# Patient Record
Sex: FEMALE | Race: WHITE | NOT HISPANIC OR LATINO | Employment: UNEMPLOYED | URBAN - METROPOLITAN AREA
[De-identification: names, ages, dates, MRNs, and addresses within clinical notes are randomized per-mention and may not be internally consistent; named-entity substitution may affect disease eponyms.]

---

## 2021-10-07 ENCOUNTER — OFFICE VISIT (OUTPATIENT)
Dept: OBSTETRICS AND GYNECOLOGY | Facility: CLINIC | Age: 41
End: 2021-10-07

## 2021-10-07 VITALS
DIASTOLIC BLOOD PRESSURE: 79 MMHG | HEIGHT: 62 IN | BODY MASS INDEX: 27.05 KG/M2 | SYSTOLIC BLOOD PRESSURE: 126 MMHG | WEIGHT: 147 LBS

## 2021-10-07 DIAGNOSIS — Z11.3 SCREENING EXAMINATION FOR STD (SEXUALLY TRANSMITTED DISEASE): ICD-10-CM

## 2021-10-07 DIAGNOSIS — N89.8 VAGINAL ODOR: ICD-10-CM

## 2021-10-07 DIAGNOSIS — R10.2 PELVIC PAIN: ICD-10-CM

## 2021-10-07 DIAGNOSIS — Z01.419 WELL WOMAN EXAM: ICD-10-CM

## 2021-10-07 DIAGNOSIS — N73.0 ACUTE PID (PELVIC INFLAMMATORY DISEASE): ICD-10-CM

## 2021-10-07 DIAGNOSIS — Z12.4 CERVICAL CANCER SCREENING: Primary | ICD-10-CM

## 2021-10-07 PROCEDURE — 3008F BODY MASS INDEX DOCD: CPT | Performed by: STUDENT IN AN ORGANIZED HEALTH CARE EDUCATION/TRAINING PROGRAM

## 2021-10-07 PROCEDURE — 2014F MENTAL STATUS ASSESS: CPT | Performed by: STUDENT IN AN ORGANIZED HEALTH CARE EDUCATION/TRAINING PROGRAM

## 2021-10-07 PROCEDURE — 99385 PREV VISIT NEW AGE 18-39: CPT | Performed by: STUDENT IN AN ORGANIZED HEALTH CARE EDUCATION/TRAINING PROGRAM

## 2021-10-07 PROCEDURE — 96372 THER/PROPH/DIAG INJ SC/IM: CPT | Performed by: STUDENT IN AN ORGANIZED HEALTH CARE EDUCATION/TRAINING PROGRAM

## 2021-10-07 RX ORDER — METRONIDAZOLE 500 MG/1
500 TABLET ORAL 2 TIMES DAILY
Qty: 28 TABLET | Refills: 0 | Status: SHIPPED | OUTPATIENT
Start: 2021-10-07 | End: 2021-10-21

## 2021-10-07 RX ORDER — CEFTRIAXONE 1 G/1
500 INJECTION, POWDER, FOR SOLUTION INTRAMUSCULAR; INTRAVENOUS ONCE
Status: COMPLETED | OUTPATIENT
Start: 2021-10-07 | End: 2021-10-07

## 2021-10-07 RX ORDER — DOXYCYCLINE HYCLATE 100 MG/1
100 CAPSULE ORAL 2 TIMES DAILY
Qty: 28 CAPSULE | Refills: 0 | Status: SHIPPED | OUTPATIENT
Start: 2021-10-07 | End: 2021-10-21

## 2021-10-07 RX ADMIN — CEFTRIAXONE 500 MG: 1 INJECTION, POWDER, FOR SOLUTION INTRAMUSCULAR; INTRAVENOUS at 16:05

## 2021-10-07 NOTE — PROGRESS NOTES
GYN Annual Exam     CC- Here for annual exam.     Nash Davila is a 41 y.o. female who presents for annual well woman exam. She also has multiple concerns today. LMP 21.   She reports that she has been having intermittent foul smelling discharge for the last 1-2 years. She has been eating yogurt and will douche with hydrogen peroxide but despite these treatments, she still has recurrences of discharge.   She also is concerned regarding abdominal pain, bloating, and vaginal spotting that occurred last week. She states that last week she laid in bed for 2 days after having heavy mid-cycle vaginal bleeding, blood in her urine, fatigue, nausea. She states that her RN friend thinks she might have had a ruptured ovarian cyst.     She has taken seasonique previously  for menstrual induced migraines.   Periods are currently in the middle of the month, last 7-8 days. Dysmenorrhea- severe- first couple days, worse as she has gotten older.   Menarche 18.5 years old   History of kidney infections. 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     OB History        3    Para   2    Term                AB   1    Living           SAB        TAB        Ectopic        Molar        Multiple        Live Births                  Currently sexually active with one male partner.   Current contraception: tubal ligation  History of abnormal Pap smear: yes- , performed biopsy unsure if she had a cone or LEEP , HPV positive   Family history of uterine, colon or ovarian cancer: unknown, adopted.  History of abnormal mammogram: n/a  Family history of breast cancer: unknown, adopted  Last Pap : - unsure of result     Past Medical History:   Diagnosis Date   • Abnormal Pap smear of cervix        Past Surgical History:   Procedure Laterality Date   • TUBAL ABDOMINAL LIGATION         No current outpatient medications on file.    No Known Allergies    Social History     Tobacco Use   • Smoking status: Current Every Day  "Smoker   Substance Use Topics   • Alcohol use: Yes     Comment: rare   • Drug use: Yes     Types: Marijuana       History reviewed. No pertinent family history.    Review of Systems   Constitutional: Positive for fatigue.   Gastrointestinal: Positive for abdominal distention and abdominal pain.   Genitourinary: Positive for vaginal bleeding and vaginal discharge.       /79   Ht 157.5 cm (62\")   Wt 66.7 kg (147 lb)   LMP  (LMP Unknown)   BMI 26.89 kg/m²     Physical Exam  Vitals reviewed. Exam conducted with a chaperone present.   Constitutional:       General: She is not in acute distress.  HENT:      Head: Normocephalic and atraumatic.      Right Ear: External ear normal.      Left Ear: External ear normal.   Eyes:      Extraocular Movements: Extraocular movements intact.      Pupils: Pupils are equal, round, and reactive to light.   Cardiovascular:      Rate and Rhythm: Normal rate and regular rhythm.   Pulmonary:      Effort: Pulmonary effort is normal. No respiratory distress.   Chest:   Breasts: Breasts are symmetrical.      Right: Normal. No swelling, bleeding, inverted nipple, mass, nipple discharge, skin change, tenderness or axillary adenopathy.      Left: Normal. No swelling, bleeding, inverted nipple, mass, nipple discharge, skin change, tenderness or axillary adenopathy.       Abdominal:      General: There is no distension.      Palpations: Abdomen is soft. There is no mass.      Tenderness: There is abdominal tenderness in the right lower quadrant, suprapubic area and left lower quadrant. There is no guarding or rebound.      Hernia: No hernia is present.   Genitourinary:     General: Normal vulva.      Exam position: Lithotomy position.      Labia:         Right: No rash, tenderness, lesion or injury.         Left: No rash, tenderness, lesion or injury.       Urethra: No prolapse or urethral swelling.      Vagina: Vaginal discharge present. No erythema, tenderness, bleeding or lesions.      " Cervix: Cervical motion tenderness present.      Uterus: Tender. Not enlarged and not fixed.       Adnexa:         Right: Tenderness present. No mass or fullness.          Left: Tenderness present. No mass or fullness.        Comments: White thin vaginal discharge present in vault.   Musculoskeletal:         General: No deformity. Normal range of motion.      Cervical back: Normal range of motion and neck supple.   Lymphadenopathy:      Upper Body:      Right upper body: No axillary adenopathy.      Left upper body: No axillary adenopathy.      Lower Body: No right inguinal adenopathy. No left inguinal adenopathy.   Skin:     General: Skin is warm and dry.   Neurological:      General: No focal deficit present.      Mental Status: She is alert and oriented to person, place, and time.   Psychiatric:         Mood and Affect: Mood normal.         Behavior: Behavior normal.       Assessment     1) GYN annual well woman exam.   2) Cervical cancer screening  3) Vaginal odor  4) Pelvic pain  5) Acute PID  6) STD screening      Plan     1) Breast Health - Clinical breast exam yearly, Self breast awareness monthly. Recommend mammogram screening for breast cancer starting at age 40.   2) Pap - pap smear with cotesting collected today and will notify patient of results.   3) Smoking status- current every day smoker. Encouraged smoking cessation.   4) Activity recommends - Adult 150-300 min/week of multi-component physical activities that include balance training, aerobic and physical strengthening.    5) Vaginal odor/pelvic pain/ acute PID/STD screening- Performed clinical pelvic exam today with chandelier sign present. Collected NuSwab+ and genital Mycoplasma swab to rule out STD as cause of presumed PID on exam. I discussed that PID is most commonly caused by STDs but can be from an ascending bacterial infection not associated with STDs. Will treat the patient with dose of rocephin 500 mg IM x one dose and send prescription  for doxycycline 100 mg BID x 14 days and flagyl 500 mg BID x 14 days. Will also obtain a pelvic ultrasound today to evaluate pelvic pain. Her pain could also be secondary to a ruptured ovarian cyst that resolved in free fluid in the pelvis that could lead to peritoneal irration and the symptoms she is experiencing. Lastly, will obtain STD screening for HIV, RPR, Hep B, and Hep C by blood testing today. Patient advised to follow up in 2 weeks. All questions and concerns answered.       Dayana Carr MD

## 2021-10-08 LAB
HBV SURFACE AG SERPL QL IA: NEGATIVE
HCV AB S/CO SERPL IA: <0.1 S/CO RATIO (ref 0–0.9)
HIV 1+2 AB+HIV1 P24 AG SERPL QL IA: NON REACTIVE
RPR SER QL: NON REACTIVE

## 2021-10-11 LAB
CYTOLOGIST CVX/VAG CYTO: NORMAL
CYTOLOGY CVX/VAG DOC CYTO: NORMAL
CYTOLOGY CVX/VAG DOC THIN PREP: NORMAL
DX ICD CODE: NORMAL
HIV 1 & 2 AB SER-IMP: NORMAL
HPV I/H RISK 4 DNA CVX QL PROBE+SIG AMP: NEGATIVE
OTHER STN SPEC: NORMAL
STAT OF ADQ CVX/VAG CYTO-IMP: NORMAL

## 2021-10-13 ENCOUNTER — TELEPHONE (OUTPATIENT)
Dept: OBSTETRICS AND GYNECOLOGY | Facility: CLINIC | Age: 41
End: 2021-10-13

## 2021-10-13 LAB
M GENITALIUM DNA SPEC QL NAA+PROBE: NEGATIVE
M HOMINIS DNA SPEC QL NAA+PROBE: POSITIVE
UREAPLASMA DNA SPEC QL NAA+PROBE: POSITIVE

## 2021-10-13 NOTE — TELEPHONE ENCOUNTER
"Hi Dr. Carr,    Pt said that since taking doxycycline (VIBRAMYCIN) 100 MG capsule      and metroNIDAZOLE (Flagyl) 500 MG tablet [9096] (Order [id number])     as directed, she is having a great deal of pain in her lower right abdomen. Not to ER level, but stated that she can't function normally. Advised to go to ER if it worsens.      Also she inquired about the remaining results for STDs: namely gonorrhea and chlamydia.     She is aware of this: \"Please call and let her know that her blood work showed she is negative for HIV, syphilis, hepatitis B, and hepatitis C. We are still awaiting her other results. Thanks!\"    Please advise.    Thank you,  Esperanza"

## 2021-10-13 NOTE — TELEPHONE ENCOUNTER
Attempted to contact the patient but call went straight to voicemail. Left voicemail stating I would try to call tomorrow.     Dayana Carr MD

## 2021-10-20 LAB
A VAGINAE DNA VAG QL NAA+PROBE: NORMAL SCORE
BVAB2 DNA VAG QL NAA+PROBE: NORMAL SCORE
C ALBICANS DNA VAG QL NAA+PROBE: NEGATIVE
C GLABRATA DNA VAG QL NAA+PROBE: NEGATIVE
C TRACH DNA VAG QL NAA+PROBE: NEGATIVE
MEGA1 DNA VAG QL NAA+PROBE: NORMAL SCORE
N GONORRHOEA DNA VAG QL NAA+PROBE: NEGATIVE
T VAGINALIS DNA VAG QL NAA+PROBE: NEGATIVE

## 2021-11-11 ENCOUNTER — OFFICE VISIT (OUTPATIENT)
Dept: OBSTETRICS AND GYNECOLOGY | Facility: CLINIC | Age: 41
End: 2021-11-11

## 2021-11-11 VITALS
HEIGHT: 62 IN | DIASTOLIC BLOOD PRESSURE: 81 MMHG | BODY MASS INDEX: 29.44 KG/M2 | SYSTOLIC BLOOD PRESSURE: 121 MMHG | WEIGHT: 160 LBS

## 2021-11-11 DIAGNOSIS — R10.2 PELVIC PAIN: Primary | ICD-10-CM

## 2021-11-11 DIAGNOSIS — N89.8 VAGINAL DISCHARGE: ICD-10-CM

## 2021-11-11 DIAGNOSIS — R32 URINARY INCONTINENCE, UNSPECIFIED TYPE: ICD-10-CM

## 2021-11-11 PROCEDURE — 99214 OFFICE O/P EST MOD 30 MIN: CPT | Performed by: STUDENT IN AN ORGANIZED HEALTH CARE EDUCATION/TRAINING PROGRAM

## 2021-11-11 RX ORDER — LEVONORGESTREL / ETHINYL ESTRADIOL AND ETHINYL ESTRADIOL 150-30(84)
1 KIT ORAL DAILY
Qty: 91 EACH | Refills: 3 | Status: SHIPPED | OUTPATIENT
Start: 2021-11-11 | End: 2022-02-10

## 2021-11-11 RX ORDER — DOXYCYCLINE HYCLATE 100 MG/1
100 CAPSULE ORAL 2 TIMES DAILY
Qty: 28 CAPSULE | Refills: 0 | Status: SHIPPED | OUTPATIENT
Start: 2021-11-11 | End: 2021-11-22

## 2021-11-11 RX ORDER — METRONIDAZOLE 500 MG/1
500 TABLET ORAL 2 TIMES DAILY
Qty: 28 TABLET | Refills: 0 | Status: SHIPPED | OUTPATIENT
Start: 2021-11-11 | End: 2021-11-22

## 2021-11-15 DIAGNOSIS — N39.0 E-COLI UTI: Primary | ICD-10-CM

## 2021-11-15 DIAGNOSIS — B96.20 E-COLI UTI: Primary | ICD-10-CM

## 2021-11-15 LAB
BACTERIA UR CULT: ABNORMAL
BACTERIA UR CULT: ABNORMAL
OTHER ANTIBIOTIC SUSC ISLT: ABNORMAL

## 2021-11-15 RX ORDER — NITROFURANTOIN 25; 75 MG/1; MG/1
100 CAPSULE ORAL 2 TIMES DAILY
Qty: 14 CAPSULE | Refills: 0 | Status: SHIPPED | OUTPATIENT
Start: 2021-11-15 | End: 2021-11-22

## 2021-11-22 DIAGNOSIS — N76.0 BV (BACTERIAL VAGINOSIS): Primary | ICD-10-CM

## 2021-11-22 DIAGNOSIS — B96.89 BV (BACTERIAL VAGINOSIS): Primary | ICD-10-CM

## 2021-11-22 LAB
A VAGINAE DNA VAG QL NAA+PROBE: ABNORMAL SCORE
BVAB2 DNA VAG QL NAA+PROBE: ABNORMAL SCORE
C ALBICANS DNA VAG QL NAA+PROBE: NEGATIVE
C GLABRATA DNA VAG QL NAA+PROBE: NEGATIVE
C TRACH DNA VAG QL NAA+PROBE: NEGATIVE
M GENITALIUM DNA SPEC QL NAA+PROBE: NEGATIVE
M HOMINIS DNA SPEC QL NAA+PROBE: POSITIVE
MEGA1 DNA VAG QL NAA+PROBE: ABNORMAL SCORE
N GONORRHOEA DNA VAG QL NAA+PROBE: NEGATIVE
T VAGINALIS DNA VAG QL NAA+PROBE: NEGATIVE
UREAPLASMA DNA SPEC QL NAA+PROBE: POSITIVE

## 2021-11-22 RX ORDER — AZITHROMYCIN 250 MG/1
TABLET, FILM COATED ORAL
Qty: 6 TABLET | Refills: 0 | Status: SHIPPED | OUTPATIENT
Start: 2021-11-22 | End: 2021-11-27

## 2021-11-26 NOTE — PROGRESS NOTES
Chief Complaint   Patient presents with   • Follow-up     left side pain        SUBJECTIVE:     Nash Davila is a 41 y.o.  who presents with left sided abdominal and pelvic pain. She was last seen on 10/7/21 for an annual exam and had concerns for a ruptured ovarian cyst because of abdominal pain, bloating and vaginal spotting. She had an ultrasound that day that noted a left hydrosalpinx and her exam was concerning for pelvic inflammatory disease. She was treated with doxycycline, flagyl, and rocephin. She reports that she struggled to take the doxycycline and flagyl together because of GI side effects. She was tested positive for ureaplasma spp and Mycoplasma hominis at the time. She reported improvement in her symptoms after completing antibiotics for about a week but has noted return of left sided fullness, fatigue, and occasional sharp pain in her left side over the last couple of days. She also has noted some cloudiness in her urine and has had intermittent leakage of urine. She also now has bloody vaginal discharge again. She is worried again for ovarian cyst rupture. Denies dysuria, fever, chills.     Past Medical History:   Diagnosis Date   • Abnormal Pap smear of cervix       Past Surgical History:   Procedure Laterality Date   • TUBAL ABDOMINAL LIGATION        Social History     Tobacco Use   • Smoking status: Current Every Day Smoker   • Smokeless tobacco: Not on file   Substance Use Topics   • Alcohol use: Yes     Comment: rare   • Drug use: Yes     Types: Marijuana     OB History    Para Term  AB Living   3 2     1     SAB IAB Ectopic Molar Multiple Live Births                    # Outcome Date GA Lbr Taco/2nd Weight Sex Delivery Anes PTL Lv   3 AB            2 Para            1 Para                 Review of Systems   Constitutional: Negative for chills and fever.   Gastrointestinal: Positive for abdominal distention and abdominal pain.   Genitourinary: Positive for pelvic  "pain, vaginal bleeding and vaginal discharge. Negative for difficulty urinating, dysuria and frequency.       OBJECTIVE:   Vitals:    11/11/21 1255   BP: 121/81   Weight: 72.6 kg (160 lb)   Height: 157.5 cm (62.01\")        Physical Exam  Vitals reviewed. Exam conducted with a chaperone present.   Constitutional:       General: She is not in acute distress.     Appearance: Normal appearance.   HENT:      Head: Normocephalic and atraumatic.      Right Ear: External ear normal.      Left Ear: External ear normal.   Eyes:      Extraocular Movements: Extraocular movements intact.      Pupils: Pupils are equal, round, and reactive to light.   Pulmonary:      Effort: Pulmonary effort is normal. No respiratory distress.   Abdominal:      General: There is no distension.      Palpations: Abdomen is soft.      Tenderness: There is abdominal tenderness in the suprapubic area and left lower quadrant. There is no guarding or rebound.   Genitourinary:     General: Normal vulva.      Exam position: Lithotomy position.      Labia:         Right: No rash, tenderness, lesion or injury.         Left: No rash, tenderness, lesion or injury.       Urethra: No prolapse or urethral swelling.      Vagina: Vaginal discharge present. No erythema, tenderness, bleeding or lesions.      Cervix: Normal.      Uterus: Normal. Not enlarged, not fixed and not tender.       Adnexa: Right adnexa normal.        Right: No mass, tenderness or fullness.          Left: Tenderness present. No mass or fullness.        Comments: Thin white vaginal discharge in vault. Mild tenderness of left adnexa.   Musculoskeletal:         General: No deformity. Normal range of motion.      Cervical back: Normal range of motion and neck supple.   Lymphadenopathy:      Lower Body: No right inguinal adenopathy. No left inguinal adenopathy.   Skin:     General: Skin is warm and dry.   Neurological:      General: No focal deficit present.      Mental Status: She is alert and " oriented to person, place, and time.   Psychiatric:         Mood and Affect: Mood normal.         Behavior: Behavior normal.         ASSESSMENT:     ICD-10-CM ICD-9-CM   1. Pelvic pain  R10.2 [id number]   2. Vaginal discharge  N89.8 623.5   3. Urinary incontinence, unspecified type  R32 788.30       PLAN:   Pelvic pain  - Pelvic ultrasound ordered and performed today which noted a left ovarian cyst and persistent left hydrosalpinx which is likely leading to her left pelvic fullness as her exam has improved significantly from last evaluation in October. Also included in differential diagnosis for pelvic pain is UTI, PID, STD. Collected NuSwab + and genital mycoplasmas to rule out STD or bacteria that could lead to PID. Urine culture collected to rule out UTI as cause of symptoms. Will have prescribe patient flagyl 500 mg BID x 14 days and doxycycline 100 mg BID x 14 days for treatment of PID again. Additionally, will have patient start Seasonique to help suppress ovulation and hopefully prevent further ovarian cysts. I discussed that she has increased risk of VTE disease, hypertension given that she is >35 years old and smokes <15 cigarettes daily. Hopefully, she will only need to use for a short time.   Vaginal discharge  - Ordered NuSwab+ and genital mycoplasma to rule out STD, vaginitis as cause of discharge.   Urinary incontinence  - Ordered urine culture to rule out UTI.   Will notify patient of results and make adjustments to treatment as needed. All questions and concerns answered. Will based follow up on symptomatic improvement or failure.     See below for orders    Orders Placed This Encounter   Procedures   • Urine Culture - Urine, Urine, Clean Catch     Order Specific Question:   Release to patient     Answer:   Immediate     Order Specific Question:   [facility] Has the patient fasted?     Answer:   No   • NuSwab VG+ - Swab, Vagina     Order Specific Question:   Release to patient     Answer:   Immediate      Order Specific Question:   [facility] Has the patient fasted?     Answer:   No   • Genital Mycoplasmas FLORIDALMA, Swab - Swab, Vagina     Order Specific Question:   Release to patient     Answer:   Immediate     Order Specific Question:   [facility] Has the patient fasted?     Answer:   No   • US Non-ob Transvaginal     Order Specific Question:   Reason for Exam:     Answer:   left pelvic pain      Dayana Carr MD

## 2022-02-10 ENCOUNTER — OFFICE VISIT (OUTPATIENT)
Dept: OBSTETRICS AND GYNECOLOGY | Facility: CLINIC | Age: 42
End: 2022-02-10

## 2022-02-10 VITALS
BODY MASS INDEX: 31.28 KG/M2 | HEIGHT: 62 IN | SYSTOLIC BLOOD PRESSURE: 114 MMHG | DIASTOLIC BLOOD PRESSURE: 76 MMHG | WEIGHT: 170 LBS

## 2022-02-10 DIAGNOSIS — R68.82 DECREASED LIBIDO: ICD-10-CM

## 2022-02-10 DIAGNOSIS — R45.86 MOOD SWINGS: ICD-10-CM

## 2022-02-10 DIAGNOSIS — N39.3 STRESS INCONTINENCE OF URINE: ICD-10-CM

## 2022-02-10 DIAGNOSIS — R22.31 MASS OF RIGHT AXILLA: Primary | ICD-10-CM

## 2022-02-10 PROCEDURE — 99213 OFFICE O/P EST LOW 20 MIN: CPT | Performed by: STUDENT IN AN ORGANIZED HEALTH CARE EDUCATION/TRAINING PROGRAM

## 2022-02-10 NOTE — PROGRESS NOTES
Chief Complaint   Patient presents with   • Follow-up     stopped b/c and has had issues with bleeding and moods,Urinary leakage        SUBJECTIVE:     Nash Davila is a 41 y.o.  who presents for follow up of pelvic pain and urinary incontinence. The patient was started on Seasonique at her last appointment to help suppress ovulation and prevent formation of ovarian cyst but 3 weeks into starting the pills she began to having vaginal spotting that turned from pink and red to dark brown and purple. This went on for 3 weeks and then the patient discontinued use due to the breakthrough bleeding. Her periods have been normal every month, lasting 7 days. She has been experiencing getting hot at night, fatigue, low libido, mood swings, and trouble with acne (which has been present for a long time.) She is also concerned regarding a nodule in her right armpit that has been off and on for 2 years and she thinks its a lymph node. It is non-tender. She also complains of urinary leakage when she coughs or sneezes that occurs randomly. She reports she does have some pain with urination after she wakes up. Denies urgency or frequency. Denies further vaginal discharge.       Past Medical History:   Diagnosis Date   • Abnormal Pap smear of cervix       Past Surgical History:   Procedure Laterality Date   • TUBAL ABDOMINAL LIGATION        Social History     Tobacco Use   • Smoking status: Current Every Day Smoker   • Smokeless tobacco: Not on file   Substance Use Topics   • Alcohol use: Yes     Comment: rare   • Drug use: Yes     Types: Marijuana     OB History    Para Term  AB Living   3 2     1     SAB IAB Ectopic Molar Multiple Live Births                    # Outcome Date GA Lbr Taco/2nd Weight Sex Delivery Anes PTL Lv   3 AB            2 Para            1 Para                 Review of Systems   Constitutional: Positive for fatigue.   Endocrine: Positive for heat intolerance.   Genitourinary: Negative  "for dysuria, frequency, urgency and vaginal discharge.   Psychiatric/Behavioral: The patient is nervous/anxious.        OBJECTIVE:   Vitals:    02/10/22 1010   BP: 114/76   Weight: 77.1 kg (170 lb)   Height: 157.5 cm (62.01\")        Physical Exam  Vitals reviewed.   Constitutional:       General: She is not in acute distress.  HENT:      Head: Normocephalic and atraumatic.      Right Ear: External ear normal.      Left Ear: External ear normal.   Eyes:      Extraocular Movements: Extraocular movements intact.      Pupils: Pupils are equal, round, and reactive to light.   Pulmonary:      Effort: Pulmonary effort is normal. No respiratory distress.   Chest:   Breasts:      Right: Axillary adenopathy (Small 0.5 nodule in right axilla) present.       Abdominal:      General: There is no distension.      Palpations: Abdomen is soft.      Tenderness: There is no abdominal tenderness. There is no guarding or rebound.   Musculoskeletal:         General: No deformity. Normal range of motion.      Cervical back: Normal range of motion and neck supple.   Lymphadenopathy:      Upper Body:      Right upper body: Axillary adenopathy (Small 0.5 nodule in right axilla) present.   Skin:     General: Skin is warm and dry.   Neurological:      General: No focal deficit present.      Mental Status: She is alert and oriented to person, place, and time.   Psychiatric:         Mood and Affect: Mood normal.         Behavior: Behavior normal.         ASSESSMENT:     ICD-10-CM ICD-9-CM   1. Mass of right axilla  R22.31 782.2   2. Mood swings  R45.86 799.24   3. Decreased libido  R68.82 799.81   4. Stress incontinence of urine  N39.3 [id number]       PLAN:   - Will obtain ultrasound of the right axilla to evaluate suspected lymph node.   - Will obtain estradiol and FSH level to see if the patient is starting to experience premature ovarian failure given perimenopausal symptoms.   - Patient had recent history of E.coli UTI in 11/2021 which is " when her urinary leakage began so have ordered urine culture to rule out UTI as cause of urinary incontinence. Will determine further follow up based on results.   - All questions and concerns answered.     See below for orders    Orders Placed This Encounter   Procedures   • Urine Culture - Urine, Urine, Clean Catch     Order Specific Question:   Release to patient     Answer:   Immediate     Order Specific Question:   [facility] Has the patient fasted?     Answer:   No   • US Axilla Right     Standing Status:   Future     Standing Expiration Date:   2/10/2023     Order Specific Question:   Reason for Exam:     Answer:   palpable lymph node in right axilia     Order Specific Question:   Release to patient     Answer:   Immediate   • Estradiol     Order Specific Question:   Release to patient     Answer:   Immediate     Order Specific Question:   [facility] Has the patient fasted?     Answer:   No   • Follicle Stimulating Hormone     Order Specific Question:   Release to patient     Answer:   Immediate     Order Specific Question:   [facility] Has the patient fasted?     Answer:   No      Dayana Carr MD

## 2022-02-11 LAB
ESTRADIOL SERPL-MCNC: 343 PG/ML
FSH SERPL-ACNC: 4.8 MIU/ML

## 2022-02-12 LAB
BACTERIA UR CULT: NO GROWTH
BACTERIA UR CULT: NORMAL

## 2022-03-11 ENCOUNTER — HOSPITAL ENCOUNTER (OUTPATIENT)
Dept: ULTRASOUND IMAGING | Facility: HOSPITAL | Age: 42
Discharge: HOME | End: 2022-03-11
Admitting: STUDENT IN AN ORGANIZED HEALTH CARE EDUCATION/TRAINING PROGRAM

## 2022-03-11 DIAGNOSIS — R22.31 MASS OF RIGHT AXILLA: ICD-10-CM

## 2022-03-11 PROCEDURE — 76882 US LMTD JT/FCL EVL NVASC XTR: CPT

## 2022-06-03 DIAGNOSIS — R59.9 SWOLLEN LYMPH NODES: Primary | ICD-10-CM

## 2022-06-14 ENCOUNTER — HOSPITAL ENCOUNTER (OUTPATIENT)
Dept: ULTRASOUND IMAGING | Facility: HOSPITAL | Age: 42
Discharge: HOME | End: 2022-06-14
Admitting: STUDENT IN AN ORGANIZED HEALTH CARE EDUCATION/TRAINING PROGRAM

## 2022-06-14 DIAGNOSIS — R59.9 SWOLLEN LYMPH NODES: ICD-10-CM

## 2022-06-14 PROCEDURE — 76882 US LMTD JT/FCL EVL NVASC XTR: CPT
